# Patient Record
Sex: FEMALE | Race: WHITE | NOT HISPANIC OR LATINO | Employment: FULL TIME | URBAN - METROPOLITAN AREA
[De-identification: names, ages, dates, MRNs, and addresses within clinical notes are randomized per-mention and may not be internally consistent; named-entity substitution may affect disease eponyms.]

---

## 2020-10-18 ENCOUNTER — HOSPITAL ENCOUNTER (EMERGENCY)
Facility: HOSPITAL | Age: 30
Discharge: HOME | End: 2020-10-18
Attending: EMERGENCY MEDICINE
Payer: COMMERCIAL

## 2020-10-18 VITALS
SYSTOLIC BLOOD PRESSURE: 126 MMHG | BODY MASS INDEX: 34.63 KG/M2 | HEART RATE: 100 BPM | DIASTOLIC BLOOD PRESSURE: 75 MMHG | TEMPERATURE: 99 F | HEIGHT: 62 IN | RESPIRATION RATE: 20 BRPM | WEIGHT: 188.19 LBS | OXYGEN SATURATION: 100 %

## 2020-10-18 DIAGNOSIS — J02.9 EXUDATIVE PHARYNGITIS: Primary | ICD-10-CM

## 2020-10-18 PROCEDURE — 25000003 PHARM REV CODE 250: Performed by: PHYSICIAN ASSISTANT

## 2020-10-18 PROCEDURE — 99284 EMERGENCY DEPT VISIT MOD MDM: CPT

## 2020-10-18 RX ORDER — PENICILLIN V POTASSIUM 250 MG/1
500 TABLET, FILM COATED ORAL
Status: COMPLETED | OUTPATIENT
Start: 2020-10-18 | End: 2020-10-18

## 2020-10-18 RX ORDER — IBUPROFEN 600 MG/1
600 TABLET ORAL EVERY 6 HOURS PRN
Qty: 12 TABLET | Refills: 0 | Status: SHIPPED | OUTPATIENT
Start: 2020-10-18 | End: 2022-04-22

## 2020-10-18 RX ORDER — IBUPROFEN 600 MG/1
600 TABLET ORAL
Status: COMPLETED | OUTPATIENT
Start: 2020-10-18 | End: 2020-10-18

## 2020-10-18 RX ORDER — PENICILLIN V POTASSIUM 500 MG/1
500 TABLET, FILM COATED ORAL 2 TIMES DAILY
Qty: 20 TABLET | Refills: 0 | Status: SHIPPED | OUTPATIENT
Start: 2020-10-18 | End: 2020-10-28

## 2020-10-18 RX ADMIN — IBUPROFEN 600 MG: 600 TABLET, FILM COATED ORAL at 01:10

## 2020-10-18 RX ADMIN — PENICILLIN V POTASIUM 500 MG: 250 TABLET ORAL at 01:10

## 2020-10-18 NOTE — ED PROVIDER NOTES
"   History      Chief Complaint   Patient presents with    Sore Throat     pt states, "I think I have strep throat," pt c/o sore throat with white patches to tonsils, x2 days       Review of patient's allergies indicates:  No Known Allergies     HPI   HPI    10/18/2020, 1:19 PM   History obtained from the patient      History of Present Illness: Candi May is a 30 y.o. female patient who presents to the Emergency Department for sore throat for 2-3 days.  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    Denies runny nose, vomiting.  Symptoms are constant and moderate in severity.   No further complaints or concerns at this time.           PCP: William Elise MD       Past Medical History:  No past medical history on file.      Past Surgical History:  No past surgical history on file.        Family History:  No family history on file.        Social History:  Social History     Tobacco Use    Smoking status: Not on file   Substance and Sexual Activity    Alcohol use: Not on file    Drug use: Not on file    Sexual activity: Not on file       ROS     Review of Systems   Constitutional: Negative for chills and fever.   HENT: Positive for sore throat. Negative for facial swelling and trouble swallowing.    Eyes: Negative for discharge, redness and visual disturbance.   Respiratory: Negative for chest tightness and shortness of breath.    Cardiovascular: Negative for chest pain and leg swelling.   Gastrointestinal: Negative for diarrhea and vomiting.   Genitourinary: Negative for decreased urine volume and dysuria.   Musculoskeletal: Negative for joint swelling and neck stiffness.   Skin: Negative for rash and wound.   Neurological: Negative for syncope and facial asymmetry.   All other systems reviewed and are negative.      Physical Exam      Initial Vitals [10/18/20 1312]   BP Pulse Resp Temp SpO2   126/75 100 20 98.7 °F (37.1 °C) 100 %      MAP       --         Physical Exam  Vital signs and nursing notes " "reviewed.  Constitutional: Patient is in NAD. Awake and alert. Well-developed and well-nourished.  Head: Atraumatic. Normocephalic.  Eyes: PERRL. EOM intact. Conjunctivae nl. No scleral icterus.  ENT: Mucous membranes are moist. Bilateral tonsillar exudates and mild edema.  +palatal petechia.  Uvula is midline, no abscess.  No trismus.  Handling secretions well.  Neck: Supple. No JVD. No lymphadenopathy.  No meningismus  Cardiovascular: Regular rate and rhythm. No murmurs, rubs, or gallops. Distal pulses are 2+ and symmetric.  Pulmonary/Chest: No respiratory distress. Clear to auscultation bilaterally. No wheezing, rales, or rhonchi.  Abdominal: Soft. Non-distended. No TTP. No rebound, guarding, or rigidity. Good bowel sounds.  Genitourinary: No CVA tenderness  Musculoskeletal: Moves all extremities. No edema.   Skin: Warm and dry.  No rash  Neurological: Awake and alert. No acute focal neurological deficits are appreciated.  Psychiatric: Normal affect. Good eye contact. Appropriate in content.      ED Course      Procedures  ED Vital Signs:  Vitals:    10/18/20 1312   BP: 126/75   Pulse: 100   Resp: 20   Temp: 98.7 °F (37.1 °C)   TempSrc: Oral   SpO2: 100%   Weight: 85.3 kg (188 lb 2.6 oz)   Height: 5' 2" (1.575 m)                 Imaging Results:  Imaging Results    None            The Emergency Provider reviewed the vital signs and test results, which are outlined above.    ED Discussion       All findings were reviewed with the patient/family in detail.  Findings seem to be most consistent with a diagnosis of strep throat.  All remaining questions and concerns were addressed at that time.  Patient/family has been counseled regarding the need for follow-up as well as the indication to return to the emergency room should new or worrisome developments occur.  Discussed throw away toothbrush, motrin/tylenol for pain.  Agrees to return to ER if unable to swallow liquids or worse in any way.        Medication(s) given " in the ER:  Medications   penicillin v potassium tablet 500 mg (500 mg Oral Given 10/18/20 1344)   ibuprofen tablet 600 mg (600 mg Oral Given 10/18/20 1346)           Follow-up Information     William Elise MD In 3 days.    Specialty: Family Medicine  Contact information:  [id number]   [address]  [facility]  [facility] [address] [phone]                       Discharge Medication List as of 10/18/2020  1:30 PM      START taking these medications    Details   ibuprofen (ADVIL,MOTRIN) 600 MG tablet Take 1 tablet (600 mg total) by mouth every 6 (six) hours as needed for Pain., Starting Sun 10/18/2020, Print      penicillin v potassium (VEETID) 500 MG tablet Take 1 tablet (500 mg total) by mouth 2 (two) times daily. for 10 days, Starting Sun 10/18/2020, Until Wed 10/28/2020, Print                Medical Decision Making        MDM               Clinical Impression:        ICD-10-CM ICD-9-CM   1. Exudative pharyngitis  J02.9 462            Disposition  Stable  Discharged     Parvin Henriquez PA-C  10/19/20 1155